# Patient Record
Sex: FEMALE | Race: WHITE | NOT HISPANIC OR LATINO
[De-identification: names, ages, dates, MRNs, and addresses within clinical notes are randomized per-mention and may not be internally consistent; named-entity substitution may affect disease eponyms.]

---

## 2022-05-24 PROBLEM — Z00.00 ENCOUNTER FOR PREVENTIVE HEALTH EXAMINATION: Status: ACTIVE | Noted: 2022-05-24

## 2022-06-20 ENCOUNTER — APPOINTMENT (OUTPATIENT)
Dept: UROGYNECOLOGY | Facility: CLINIC | Age: 47
End: 2022-06-20
Payer: COMMERCIAL

## 2022-06-20 VITALS
HEART RATE: 76 BPM | BODY MASS INDEX: 23.54 KG/M2 | WEIGHT: 150 LBS | HEIGHT: 67 IN | SYSTOLIC BLOOD PRESSURE: 133 MMHG | DIASTOLIC BLOOD PRESSURE: 82 MMHG

## 2022-06-20 DIAGNOSIS — N81.2 INCOMPLETE UTEROVAGINAL PROLAPSE: ICD-10-CM

## 2022-06-20 DIAGNOSIS — N81.11 CYSTOCELE, MIDLINE: ICD-10-CM

## 2022-06-20 DIAGNOSIS — E07.9 DISORDER OF THYROID, UNSPECIFIED: ICD-10-CM

## 2022-06-20 DIAGNOSIS — N81.6 RECTOCELE: ICD-10-CM

## 2022-06-20 DIAGNOSIS — N36.41 HYPERMOBILITY OF URETHRA: ICD-10-CM

## 2022-06-20 DIAGNOSIS — N39.46 MIXED INCONTINENCE: ICD-10-CM

## 2022-06-20 DIAGNOSIS — Z78.9 OTHER SPECIFIED HEALTH STATUS: ICD-10-CM

## 2022-06-20 PROCEDURE — 99203 OFFICE O/P NEW LOW 30 MIN: CPT

## 2022-06-21 PROBLEM — E07.9 THYROID DISORDER: Status: ACTIVE | Noted: 2022-06-21

## 2022-06-21 PROBLEM — Z78.9 NON-SMOKER: Status: ACTIVE | Noted: 2022-06-21

## 2022-06-21 RX ORDER — SERTRALINE HYDROCHLORIDE 50 MG/1
50 TABLET, FILM COATED ORAL
Refills: 0 | Status: ACTIVE | COMMUNITY

## 2022-06-21 RX ORDER — LEVOTHYROXINE SODIUM 75 UG/1
75 TABLET ORAL
Refills: 0 | Status: ACTIVE | COMMUNITY

## 2022-06-21 NOTE — DISCUSSION/SUMMARY
[FreeTextEntry1] : I reviewed the above findings with the patient with visual illustrations. Treatment options for the prolapse were discussed and included doing nothing, Kegel exercises and behavioral modification, a pessary, or surgical correction.  We also discussed the stress incontinence and possible overactive bladder.  We discussed starting with conservative management as she is not so symptomatic at this point in time with the prolapse.  We discussed Kegel exercises which can help with the pelvic organ prolapse and urinary incontinence.  We discussed going for physical therapy however she would like to try doing it herself.  We discussed following up here on an as-needed basis and otherwise she will follow-up with her gynecologist.  [facility] patient information on pelvic organ prolapse, rest incontinence, overactive bladder, and Kegel instructions was given to her.

## 2022-06-21 NOTE — PHYSICAL EXAM
[Chaperone Present] : A chaperone was present in the examining room during all aspects of the physical examination [No Acute Distress] : in no acute distress [Well developed] : well developed [Well Nourished] : ~L well nourished [Respirations regular] : ~T respiratory rate was regular [Warm and Dry] : was warm and dry to touch [Normal Gait] : gait was normal [Normal] : normal external genitalia [Normal Appearance] : general appearance was normal [Rectocele] : a rectocele [Cystocele] : a cystocele [Uterine Prolapse] : uterine prolapse [Aa ____] : Aa [unfilled] [Ba ____] : Ba [unfilled] [C ____] : C [unfilled] [GH ____] : GH [unfilled] [PB ____] : PB [unfilled] [TVL ____] : TVL  [unfilled] [Ap ____] : Ap [unfilled] [Bp ____] : Bp [unfilled] [D ____] : D [unfilled] [Anxiety] : patient is not anxious [Tenderness] : ~T no ~M abdominal tenderness observed [Distended] : not distended [FreeTextEntry3] : + hypermoblity

## 2022-06-21 NOTE — HISTORY OF PRESENT ILLNESS
[Uterine Prolapse] : no [Urinary Frequency] : no [Feelings Of Urinary Urgency] : no [Urinary Tract Infection] : no [Urinary Frequency More Than Twice At Night (Nocturia)] : no nocturia [] : no [FreeTextEntry5] : 2x in the past - during BM [de-identified] : + heavy menses  [de-identified] : sometimes if bladder full with cough [de-identified] : sometimes [de-identified] : + sexually active [FreeTextEntry1] : Pt things have imrproved since visit with GYN in January